# Patient Record
Sex: MALE | URBAN - METROPOLITAN AREA
[De-identification: names, ages, dates, MRNs, and addresses within clinical notes are randomized per-mention and may not be internally consistent; named-entity substitution may affect disease eponyms.]

---

## 2024-01-13 ENCOUNTER — OFFICE VISIT (OUTPATIENT)
Age: 29
End: 2024-01-13

## 2024-01-13 VITALS
HEART RATE: 100 BPM | HEIGHT: 71 IN | OXYGEN SATURATION: 97 % | DIASTOLIC BLOOD PRESSURE: 97 MMHG | WEIGHT: 263 LBS | TEMPERATURE: 98.3 F | RESPIRATION RATE: 16 BRPM | BODY MASS INDEX: 36.82 KG/M2 | SYSTOLIC BLOOD PRESSURE: 144 MMHG

## 2024-01-13 DIAGNOSIS — R05.1 ACUTE COUGH: Primary | ICD-10-CM

## 2024-01-13 DIAGNOSIS — J06.9 UPPER RESPIRATORY TRACT INFECTION, UNSPECIFIED TYPE: ICD-10-CM

## 2024-01-13 LAB
Lab: NORMAL
QC PASS/FAIL: NORMAL
SARS-COV-2 RDRP RESP QL NAA+PROBE: NEGATIVE

## 2024-01-13 ASSESSMENT — ENCOUNTER SYMPTOMS
SORE THROAT: 0
RHINORRHEA: 0
SHORTNESS OF BREATH: 0
EYE REDNESS: 0
ABDOMINAL PAIN: 0
WHEEZING: 0
VOMITING: 0
HEARTBURN: 0
COUGH: 1
HEMOPTYSIS: 0
DIARRHEA: 0
SINUS PRESSURE: 0

## 2024-01-13 NOTE — PROGRESS NOTES
Olaf Davis (:  1995) is a 28 y.o. male,New patient, here for evaluation of the following chief complaint(s):  Cough (Cough started today )      ASSESSMENT/PLAN:  1. Acute cough    - POCT COVID-19 Rapid, NAAT    2. Upper respiratory tract infection, unspecified type    -pt was advised to increase fluid intake,take Tylenol and otc decongestant as needed,return to  if worsening symptoms       No follow-ups on file.    SUBJECTIVE/OBJECTIVE:    History provided by:  Patient and parent  Cough  This is a new problem. The current episode started today. The problem has been unchanged. The cough is Productive of sputum. Associated symptoms include nasal congestion. Pertinent negatives include no chest pain, chills, ear pain, eye redness, fever, headaches, heartburn, hemoptysis, myalgias, postnasal drip, rash, rhinorrhea, sore throat, shortness of breath, sweats or wheezing. There is no history of asthma or environmental allergies.       Vitals:    24 1820   BP: (!) 144/97   Site: Right Upper Arm   Position: Sitting   Cuff Size: Large Adult   Pulse: 100   Resp: 16   Temp: 98.3 °F (36.8 °C)   TempSrc: Oral   SpO2: 97%   Weight: 119.3 kg (263 lb)   Height: 1.803 m (5' 11\")       Review of Systems   Constitutional:  Negative for activity change, appetite change, chills and fever.   HENT:  Positive for congestion. Negative for ear pain, postnasal drip, rhinorrhea, sinus pressure and sore throat.    Eyes:  Negative for redness.   Respiratory:  Positive for cough. Negative for hemoptysis, shortness of breath and wheezing.    Cardiovascular:  Negative for chest pain.   Gastrointestinal:  Negative for abdominal pain, diarrhea, heartburn and vomiting.   Musculoskeletal:  Negative for myalgias.   Skin:  Negative for rash.   Allergic/Immunologic: Negative for environmental allergies.   Neurological:  Negative for headaches.       Physical Exam  Constitutional:       General: He is not in acute distress.  HENT:      Nose:

## 2024-06-16 ENCOUNTER — HOSPITAL ENCOUNTER (EMERGENCY)
Age: 29
Discharge: HOME | End: 2024-06-16
Payer: COMMERCIAL

## 2024-06-16 VITALS
OXYGEN SATURATION: 98 % | TEMPERATURE: 99.32 F | SYSTOLIC BLOOD PRESSURE: 140 MMHG | DIASTOLIC BLOOD PRESSURE: 72 MMHG | RESPIRATION RATE: 16 BRPM | HEART RATE: 140 BPM

## 2024-06-16 DIAGNOSIS — F89: ICD-10-CM

## 2024-06-16 DIAGNOSIS — H65.02: Primary | ICD-10-CM

## 2024-06-16 DIAGNOSIS — J02.9: ICD-10-CM

## 2024-06-16 PROCEDURE — 87081 CULTURE SCREEN ONLY: CPT

## 2024-06-16 PROCEDURE — G0463 HOSPITAL OUTPT CLINIC VISIT: HCPCS

## 2024-06-16 PROCEDURE — 99213 OFFICE O/P EST LOW 20 MIN: CPT

## 2024-06-16 PROCEDURE — 87880 STREP A ASSAY W/OPTIC: CPT
